# Patient Record
Sex: MALE | NOT HISPANIC OR LATINO | URBAN - METROPOLITAN AREA
[De-identification: names, ages, dates, MRNs, and addresses within clinical notes are randomized per-mention and may not be internally consistent; named-entity substitution may affect disease eponyms.]

---

## 2020-02-05 ENCOUNTER — IMPORTED ENCOUNTER (OUTPATIENT)
Dept: URBAN - METROPOLITAN AREA CLINIC 1 | Facility: CLINIC | Age: 60
End: 2020-02-05

## 2020-02-05 PROBLEM — H53.021: Noted: 2020-02-05

## 2020-02-05 PROBLEM — H25.813: Noted: 2020-02-05

## 2020-02-05 PROBLEM — H25.13: Noted: 2020-02-05

## 2020-02-05 PROBLEM — H10.45: Noted: 2020-02-05

## 2020-02-05 PROBLEM — H53.001: Noted: 2020-02-05

## 2020-02-05 PROCEDURE — 92015 DETERMINE REFRACTIVE STATE: CPT

## 2020-02-05 PROCEDURE — 92004 COMPRE OPH EXAM NEW PT 1/>: CPT

## 2020-02-05 NOTE — PATIENT DISCUSSION
1.  Cataract OU -- Observe for now without intervention. The patient was advised to contact us if any change or worsening of vision2. Allergic Conjunctivitis OU -- Condition discussed with patient. Advised patient to use OTC Zaditor one drop OU BID prn.3. Refractive Amblyopia OD. Mrx for Glasses given today. Return for an appointment in 1 year for a 30 with Dr. Isaak Riojas.

## 2022-04-02 ASSESSMENT — VISUAL ACUITY
OS_CC: J1
OD_CC: J1
OS_CC: 20/20
OD_CC: 20/40

## 2022-04-02 ASSESSMENT — TONOMETRY
OS_IOP_MMHG: 15
OD_IOP_MMHG: 15

## 2022-06-10 NOTE — PATIENT DISCUSSION
Cataract surgery has been performed in the first eye and activities of daily living are still impaired. The patient would like to proceed with cataract surgery in the second eye as scheduled. The patient elects BV OS, goal of marcel.

## 2022-06-23 NOTE — PATIENT DISCUSSION
1 Week PO: Patient is doing well post-operatively. The importance of post-op drop compliance was emphasized. Drop schedule reviewed with patient. Patient to call if any visual changes or concerns.
2 week PO: Patient is doing well post-operatively. The importance of post-op drop compliance was emphasized. Drop scheduled reviewed with patient. Patient to call if any visual changes or concerns.
Discussed condition and exacerbating conditions/situations (e.g., dry/arid environments, overhead fans, air conditioners, side effect of medications).
Instructed to call immediately if any new distortion, blurring, decreased vision or eye pain.
Monitor for increased risk corneal edema.
Monitor.
OCT MAC OU done today; results reviewed with patient.
Patient advised to call if any problems, questions, or concerns.
Patient is cleared for anesthesia.
Patient made aware of 24/7 emergency services.
Patient understands condition, prognosis and need for follow up care.
Recommended artificial tears to use: 1 drop 4x a day in both eyes.
7

## 2022-08-25 ENCOUNTER — COMPREHENSIVE EXAM (OUTPATIENT)
Dept: URBAN - METROPOLITAN AREA CLINIC 1 | Facility: CLINIC | Age: 62
End: 2022-08-25

## 2022-08-25 DIAGNOSIS — H25.813: ICD-10-CM

## 2022-08-25 DIAGNOSIS — H10.45: ICD-10-CM

## 2022-08-25 PROCEDURE — 92014 COMPRE OPH EXAM EST PT 1/>: CPT

## 2022-08-25 PROCEDURE — 92015 DETERMINE REFRACTIVE STATE: CPT

## 2022-08-25 ASSESSMENT — VISUAL ACUITY
OS_BAT: 20/40
OD_BAT: 20/40
OD_SC: 20/40
OS_SC: 20/20

## 2022-08-25 ASSESSMENT — TONOMETRY
OS_IOP_MMHG: 16
OD_IOP_MMHG: 16

## 2024-03-18 ENCOUNTER — COMPREHENSIVE EXAM (OUTPATIENT)
Dept: URBAN - METROPOLITAN AREA CLINIC 1 | Facility: CLINIC | Age: 64
End: 2024-03-18

## 2024-03-18 DIAGNOSIS — Z01.00: ICD-10-CM

## 2024-03-18 DIAGNOSIS — H52.222: ICD-10-CM

## 2024-03-18 DIAGNOSIS — H52.01: ICD-10-CM

## 2024-03-18 DIAGNOSIS — H52.4: ICD-10-CM

## 2024-03-18 PROCEDURE — 92014 COMPRE OPH EXAM EST PT 1/>: CPT

## 2024-03-18 PROCEDURE — 92015 DETERMINE REFRACTIVE STATE: CPT

## 2024-03-18 ASSESSMENT — TONOMETRY
OS_IOP_MMHG: 16
OD_IOP_MMHG: 16

## 2024-03-18 ASSESSMENT — VISUAL ACUITY
OS_SC: 20/20-1
OD_SC: 20/50